# Patient Record
Sex: FEMALE | Race: WHITE | NOT HISPANIC OR LATINO | Employment: OTHER | URBAN - METROPOLITAN AREA
[De-identification: names, ages, dates, MRNs, and addresses within clinical notes are randomized per-mention and may not be internally consistent; named-entity substitution may affect disease eponyms.]

---

## 2020-07-10 PROBLEM — F41.8 SITUATIONAL ANXIETY: Status: ACTIVE | Noted: 2020-07-10

## 2020-07-10 PROBLEM — G47.00 INSOMNIA: Status: ACTIVE | Noted: 2020-07-10

## 2021-04-28 DIAGNOSIS — Z12.31 OTHER SCREENING MAMMOGRAM: ICD-10-CM

## 2021-09-03 PROBLEM — F43.22 ADJUSTMENT DISORDER WITH ANXIOUS MOOD: Status: ACTIVE | Noted: 2021-09-03

## 2023-08-29 ENCOUNTER — NURSE TRIAGE (OUTPATIENT)
Dept: ADMINISTRATIVE | Facility: CLINIC | Age: 68
End: 2023-08-29

## 2023-08-29 NOTE — TELEPHONE ENCOUNTER
Reason for Disposition   Caller requesting a CONTROLLED substance prescription refill (e.g., narcotics, ADHD medicines)    Additional Information   Negative: New-onset or worsening symptoms, see that protocol (e.g., diarrhea, runny nose, sore throat)   Negative: Medicine question not related to refill or renewal   Negative: Caller (e.g., patient or pharmacist) requesting information about a new medicine   Negative: Caller requesting information unrelated to medicine   Negative: Prescription refill request for ESSENTIAL medicine (i.e., likelihood of harm to patient if not taken) and triager unable to refill per department policy   Negative: Prescription not at pharmacy and was prescribed by PCP recently  (Exception: triager has access to EMR and prescription is recorded there. Go to [facility] and confirm for pharmacy.)   Negative: Pharmacy calling with prescription questions and triager unable to answer question    Protocols used: Medication Refill and Renewal Call-A-OH  Pt states her PCP left and she has been assigned to Dr. Goff. Pt states she's called several times and no one has called her back from the office. She would like to have oxycodone reduced to 7.5 mg. She states karen she takes the full tab (10 mg) she feels nauseated. Pt also states she cannot see her lab results on the portal. She states if they would just call her back she could explain. Advised pt this message will go to the Provider and office staff now. She verbalized understanding.

## 2024-02-22 PROBLEM — R59.1 LYMPHADENOPATHY: Status: ACTIVE | Noted: 2024-02-22

## 2024-02-22 PROBLEM — R22.1 NECK MASS: Status: ACTIVE | Noted: 2024-02-22

## 2024-03-06 ENCOUNTER — PATIENT OUTREACH (OUTPATIENT)
Dept: ADMINISTRATIVE | Facility: HOSPITAL | Age: 69
End: 2024-03-06

## 2024-03-13 PROBLEM — F19.20 CHEMICAL DEPENDENCY: Status: ACTIVE | Noted: 2024-03-13

## 2024-03-13 PROBLEM — M47.816 ARTHRITIS OF LUMBAR SPINE: Status: ACTIVE | Noted: 2024-03-13

## 2024-03-13 PROBLEM — F11.20 UNCOMPLICATED OPIOID DEPENDENCE: Status: ACTIVE | Noted: 2024-03-13

## 2024-03-24 ENCOUNTER — PATIENT MESSAGE (OUTPATIENT)
Dept: ADMINISTRATIVE | Facility: HOSPITAL | Age: 69
End: 2024-03-24

## 2024-05-30 ENCOUNTER — PATIENT MESSAGE (OUTPATIENT)
Dept: ADMINISTRATIVE | Facility: HOSPITAL | Age: 69
End: 2024-05-30